# Patient Record
Sex: MALE | Race: WHITE | NOT HISPANIC OR LATINO | Employment: FULL TIME | URBAN - METROPOLITAN AREA
[De-identification: names, ages, dates, MRNs, and addresses within clinical notes are randomized per-mention and may not be internally consistent; named-entity substitution may affect disease eponyms.]

---

## 2018-05-29 ENCOUNTER — CONVERSION ENCOUNTER (OUTPATIENT)
Dept: SURGERY | Facility: CLINIC | Age: 48
End: 2018-05-29
Attending: SURGERY

## 2018-06-14 ENCOUNTER — OFFICE VISIT CONVERTED (OUTPATIENT)
Dept: SURGERY | Facility: CLINIC | Age: 48
End: 2018-06-14
Attending: SURGERY

## 2018-06-21 ENCOUNTER — OFFICE VISIT CONVERTED (OUTPATIENT)
Dept: SURGERY | Facility: CLINIC | Age: 48
End: 2018-06-21
Attending: SURGERY

## 2021-05-16 VITALS — RESPIRATION RATE: 14 BRPM | WEIGHT: 185.25 LBS | BODY MASS INDEX: 27.44 KG/M2 | HEIGHT: 69 IN

## 2024-06-10 ENCOUNTER — TRANSCRIBE ORDERS (OUTPATIENT)
Dept: ADMINISTRATIVE | Facility: HOSPITAL | Age: 54
End: 2024-06-10
Payer: COMMERCIAL

## 2024-06-10 DIAGNOSIS — K21.9 GERD WITHOUT ESOPHAGITIS: Primary | ICD-10-CM

## 2025-03-17 ENCOUNTER — OFFICE VISIT (OUTPATIENT)
Dept: FAMILY MEDICINE CLINIC | Facility: CLINIC | Age: 55
End: 2025-03-17
Payer: COMMERCIAL

## 2025-03-17 VITALS
DIASTOLIC BLOOD PRESSURE: 76 MMHG | HEIGHT: 69 IN | WEIGHT: 202 LBS | BODY MASS INDEX: 29.92 KG/M2 | OXYGEN SATURATION: 98 % | HEART RATE: 89 BPM | TEMPERATURE: 98 F | SYSTOLIC BLOOD PRESSURE: 124 MMHG

## 2025-03-17 DIAGNOSIS — Z12.11 ENCOUNTER FOR SCREENING FOR MALIGNANT NEOPLASM OF COLON: ICD-10-CM

## 2025-03-17 DIAGNOSIS — R06.83 SNORING: ICD-10-CM

## 2025-03-17 DIAGNOSIS — R53.83 OTHER FATIGUE: ICD-10-CM

## 2025-03-17 DIAGNOSIS — Z12.5 ENCOUNTER FOR SCREENING PROSTATE SPECIFIC ANTIGEN (PSA) MEASUREMENT: ICD-10-CM

## 2025-03-17 DIAGNOSIS — Z13.29 THYROID DISORDER SCREEN: ICD-10-CM

## 2025-03-17 DIAGNOSIS — Z11.59 ENCOUNTER FOR HEPATITIS C SCREENING TEST FOR LOW RISK PATIENT: ICD-10-CM

## 2025-03-17 DIAGNOSIS — R68.89 FORGETFULNESS: ICD-10-CM

## 2025-03-17 DIAGNOSIS — Z13.6 ENCOUNTER FOR SCREENING FOR CARDIOVASCULAR DISORDERS: ICD-10-CM

## 2025-03-17 DIAGNOSIS — Z13.1 DIABETES MELLITUS SCREENING: ICD-10-CM

## 2025-03-17 DIAGNOSIS — E66.3 OVERWEIGHT (BMI 25.0-29.9): ICD-10-CM

## 2025-03-17 DIAGNOSIS — Z00.00 ENCOUNTER FOR ANNUAL PHYSICAL EXAM: ICD-10-CM

## 2025-03-17 DIAGNOSIS — Z76.89 ENCOUNTER TO ESTABLISH CARE: Primary | ICD-10-CM

## 2025-03-17 LAB
25(OH)D3 SERPL-MCNC: 13.1 NG/ML (ref 30–100)
ALBUMIN SERPL-MCNC: 4.1 G/DL (ref 3.5–5.2)
ALBUMIN/GLOB SERPL: 1.4 G/DL
ALP SERPL-CCNC: 91 U/L (ref 39–117)
ALT SERPL W P-5'-P-CCNC: 17 U/L (ref 1–41)
ANION GAP SERPL CALCULATED.3IONS-SCNC: 10.5 MMOL/L (ref 5–15)
AST SERPL-CCNC: 21 U/L (ref 1–40)
BASOPHILS # BLD AUTO: 0.07 10*3/MM3 (ref 0–0.2)
BASOPHILS NFR BLD AUTO: 0.7 % (ref 0–1.5)
BILIRUB SERPL-MCNC: 0.3 MG/DL (ref 0–1.2)
BUN SERPL-MCNC: 10 MG/DL (ref 6–20)
BUN/CREAT SERPL: 9.1 (ref 7–25)
CALCIUM SPEC-SCNC: 9.1 MG/DL (ref 8.6–10.5)
CHLORIDE SERPL-SCNC: 104 MMOL/L (ref 98–107)
CHOLEST SERPL-MCNC: 199 MG/DL (ref 0–200)
CO2 SERPL-SCNC: 26.5 MMOL/L (ref 22–29)
CREAT SERPL-MCNC: 1.1 MG/DL (ref 0.76–1.27)
DEPRECATED RDW RBC AUTO: 44.5 FL (ref 37–54)
EGFRCR SERPLBLD CKD-EPI 2021: 80.3 ML/MIN/1.73
EOSINOPHIL # BLD AUTO: 0.33 10*3/MM3 (ref 0–0.4)
EOSINOPHIL NFR BLD AUTO: 3.5 % (ref 0.3–6.2)
ERYTHROCYTE [DISTWIDTH] IN BLOOD BY AUTOMATED COUNT: 13.1 % (ref 12.3–15.4)
FERRITIN SERPL-MCNC: 313 NG/ML (ref 30–400)
FOLATE SERPL-MCNC: 7.32 NG/ML (ref 4.78–24.2)
GLOBULIN UR ELPH-MCNC: 3 GM/DL
GLUCOSE SERPL-MCNC: 99 MG/DL (ref 65–99)
HBA1C MFR BLD: 5.6 % (ref 4.8–5.6)
HCT VFR BLD AUTO: 52.8 % (ref 37.5–51)
HCV AB SER QL: NORMAL
HDLC SERPL-MCNC: 30 MG/DL (ref 40–60)
HGB BLD-MCNC: 17.3 G/DL (ref 13–17.7)
IMM GRANULOCYTES # BLD AUTO: 0.03 10*3/MM3 (ref 0–0.05)
IMM GRANULOCYTES NFR BLD AUTO: 0.3 % (ref 0–0.5)
LDLC SERPL CALC-MCNC: 130 MG/DL (ref 0–100)
LDLC/HDLC SERPL: 4.19 {RATIO}
LYMPHOCYTES # BLD AUTO: 2.15 10*3/MM3 (ref 0.7–3.1)
LYMPHOCYTES NFR BLD AUTO: 22.7 % (ref 19.6–45.3)
MAGNESIUM SERPL-MCNC: 2.3 MG/DL (ref 1.6–2.6)
MCH RBC QN AUTO: 30.7 PG (ref 26.6–33)
MCHC RBC AUTO-ENTMCNC: 32.8 G/DL (ref 31.5–35.7)
MCV RBC AUTO: 93.6 FL (ref 79–97)
MONOCYTES # BLD AUTO: 0.75 10*3/MM3 (ref 0.1–0.9)
MONOCYTES NFR BLD AUTO: 7.9 % (ref 5–12)
NEUTROPHILS NFR BLD AUTO: 6.15 10*3/MM3 (ref 1.7–7)
NEUTROPHILS NFR BLD AUTO: 64.9 % (ref 42.7–76)
NRBC BLD AUTO-RTO: 0 /100 WBC (ref 0–0.2)
PLATELET # BLD AUTO: 270 10*3/MM3 (ref 140–450)
PMV BLD AUTO: 10.4 FL (ref 6–12)
POTASSIUM SERPL-SCNC: 4.5 MMOL/L (ref 3.5–5.2)
PROT SERPL-MCNC: 7.1 G/DL (ref 6–8.5)
PSA SERPL-MCNC: 0.9 NG/ML (ref 0–4)
RBC # BLD AUTO: 5.64 10*6/MM3 (ref 4.14–5.8)
SODIUM SERPL-SCNC: 141 MMOL/L (ref 136–145)
T4 FREE SERPL-MCNC: 0.81 NG/DL (ref 0.92–1.68)
TRIGL SERPL-MCNC: 216 MG/DL (ref 0–150)
TSH SERPL DL<=0.05 MIU/L-ACNC: 6.86 UIU/ML (ref 0.27–4.2)
VIT B12 BLD-MCNC: 629 PG/ML (ref 211–946)
VLDLC SERPL-MCNC: 39 MG/DL (ref 5–40)
WBC NRBC COR # BLD AUTO: 9.48 10*3/MM3 (ref 3.4–10.8)

## 2025-03-17 PROCEDURE — 80061 LIPID PANEL: CPT

## 2025-03-17 PROCEDURE — 80050 GENERAL HEALTH PANEL: CPT

## 2025-03-17 PROCEDURE — 86803 HEPATITIS C AB TEST: CPT

## 2025-03-17 PROCEDURE — 82746 ASSAY OF FOLIC ACID SERUM: CPT

## 2025-03-17 PROCEDURE — 82306 VITAMIN D 25 HYDROXY: CPT

## 2025-03-17 PROCEDURE — 83735 ASSAY OF MAGNESIUM: CPT

## 2025-03-17 PROCEDURE — 99386 PREV VISIT NEW AGE 40-64: CPT

## 2025-03-17 PROCEDURE — 36415 COLL VENOUS BLD VENIPUNCTURE: CPT

## 2025-03-17 PROCEDURE — 82607 VITAMIN B-12: CPT

## 2025-03-17 PROCEDURE — G0103 PSA SCREENING: HCPCS

## 2025-03-17 PROCEDURE — 84439 ASSAY OF FREE THYROXINE: CPT

## 2025-03-17 PROCEDURE — 82728 ASSAY OF FERRITIN: CPT

## 2025-03-17 PROCEDURE — 83036 HEMOGLOBIN GLYCOSYLATED A1C: CPT

## 2025-03-17 NOTE — PROGRESS NOTES
Chief Complaint  Annual Exam (With labs), Fatigue (Pt c/o being tired with no energy ), and Establish Care    Little interest or pleasure in doing things? Nearly every day   Feeling down, depressed, or hopeless? Not at all   PHQ-2 Total Score 3   Trouble falling or staying asleep, or sleeping too much? Nearly every day   Feeling tired or having little energy? Nearly every day   Poor appetite or overeating? Not at all   Feeling bad about yourself - or that you are a failure or have let yourself or your family down? Not at all   Trouble concentrating on things, such as reading the newspaper or watching television? Several days   Moving or speaking so slowly that other people could have noticed? Or the opposite - being so fidgety or restless that you have been moving around a lot more than usual? Not at all     Thoughts that you would be better off dead, or of hurting yourself in some way? Not at all   PHQ-9 Total Score 10   If you checked off any problems, how difficult have these problems made it for you to do your work, take care of things at home, or get along with other people? Somewhat difficult               History of Present Illness:  Vargas Kang is a 54 y.o. male who presents to [facility] with a past medical history of  Past Medical History:   Diagnosis Date    Carcinoma of oropharynx 2015    Stage LUIZ ([id number]) squamous cell carcinoma        History of Present Illness  The patient is a 54-year-old male who presents today to establish care for an annual wellness visit.    Approximately 6 months ago, he began experiencing gallbladder attacks, which he attributes to the consumption of greasy food. He has since eliminated vegetable oil from his diet and replaced margarine with beef tallow and real butter, resulting in the cessation of his gallbladder issues. He has observed a decrease in his weight since modifying his diet, although he still considers it to be slightly  "elevated. He reports no presence of blood in his stool. He has not had any colon cancer screening. He underwent a colonoscopy within the past 3 to 5 years and is due for a follow-up. He does not recall being informed of any vitamin deficiencies during his cancer treatment. He reports no urinary issues or pressure. He has no history of cardiac issues, chest pain, or syncope. He also reports no ear-related issues or dizziness. He occasionally experiences hip discomfort, which he attributes to prolonged periods of lying in one position. He reports no lower back pain. He has not had his thyroid checked. He reports no family history of thyroid disease, colon cancer, lung cancer, breast cancer, or heart disease. His blood pressure is usually low, around 105/60. He reports no anxiety.    Over the past few years, he has experienced a significant decrease in motivation, leading to excessive sleep and memory lapses. He believes he snores and feels aggravated due to his lack of motivation. He acknowledges the need to reduce his sugar intake and improve his physical activity levels. He is currently employed at [employer] as a .    He has a history of HPV-related cancer, which is currently in remission. He does not have any follow-up with hematology or oncology.    Supplemental Information  He had a hernia surgery in 2018 and has had no issues with that since.    SOCIAL HISTORY   He uses  chew tobacco. He hardly ever drinks alcohol. He is currently employed at [employer] as a .    FAMILY HISTORY  He reports no family history of thyroid disease, colon cancer, lung cancer, breast cancer, or heart disease. His cousins, Griselda and Chad, have diabetes.      Objective   Vital Signs:   Vitals:    03/17/25 0812   BP: 124/76   BP Location: Right arm   Pulse: 89   Temp: 98 °F (36.7 °C)   TempSrc: Temporal   SpO2: 98%   Weight: 91.6 kg (202 lb)   Height: 175.3 cm (69\")     Body mass index is 29.83 " kg/m².    Wt Readings from Last 3 Encounters:   03/17/25 91.6 kg (202 lb)   05/29/18 84 kg (185 lb 4 oz)     BP Readings from Last 3 Encounters:   03/17/25 124/76       Health Maintenance   Topic Date Due    COLORECTAL CANCER SCREENING  Never done    HEPATITIS C SCREENING  Never done    ANNUAL PHYSICAL  Never done    ZOSTER VACCINE (1 of 2) 03/17/2025 (Originally 11/9/2021)    COVID-19 Vaccine (1 - 2024-25 season) 03/19/2025 (Originally 9/1/2024)    INFLUENZA VACCINE  03/31/2025 (Originally 7/1/2024)    Pneumococcal Vaccine 50+ (1 of 1 - PCV) 03/17/2026 (Originally 11/9/2021)    TDAP/TD VACCINES (1 - Tdap) 03/17/2026 (Originally 11/9/1990)    BMI FOLLOWUP  03/17/2026       Review of Systems   Physical Exam  Vitals reviewed.   Constitutional:       Appearance: Normal appearance.   Eyes:      Pupils: Pupils are equal, round, and reactive to light.   Cardiovascular:      Rate and Rhythm: Normal rate and regular rhythm.   Pulmonary:      Effort: Pulmonary effort is normal.      Breath sounds: Normal breath sounds.   Musculoskeletal:      Cervical back: Normal range of motion.   Skin:     General: Skin is warm and dry.   Neurological:      General: No focal deficit present.      Mental Status: He is alert and oriented to person, place, and time.   Psychiatric:         Mood and Affect: Mood normal.            Result Review :  The following data was reviewed by: RICK Rao on 03/17/2025:  No visits with results within 1 Month(s) from this visit.   Latest known visit with results is:   No results found for any previous visit.       Results        Procedures        Assessment and Plan   Diagnoses and all orders for this visit:    1. Encounter to establish care (Primary)    2. Encounter for annual physical exam    3. Other fatigue  -     Vitamin B12 & Folate  -     Comprehensive Metabolic Panel  -     CBC Auto Differential  -     Magnesium    4. Snoring  -     Ferritin    5. Forgetfulness  -     Vitamin B12 &  Folate    6. Diabetes mellitus screening  -     Hemoglobin A1c    7. Encounter for screening for cardiovascular disorders  -     Lipid Panel    8. Overweight (BMI 25.0-29.9)  -     Vitamin D,25-Hydroxy    9. Encounter for hepatitis C screening test for low risk patient  -     Hepatitis C Antibody    10. Thyroid disorder screen  -     TSH Rfx On Abnormal To Free T4    11. Encounter for screening for malignant neoplasm of colon  -     Ambulatory Referral For Screening Colonoscopy    12. Encounter for screening prostate specific antigen (PSA) measurement  -     PSA Screen        Assessment & Plan  1. Annual wellness visit.  He has not had a colonoscopy in the last 3 to 5 years. He reports no issues with urination, chest pain, or passing out. He has a history of low blood pressure but no symptoms of dizziness. He is not on any medications. He is advised to set up [employer] for easy communication and access to his health information. A comprehensive panel of laboratory tests will be conducted to assess his overall health status, including thyroid function and vitamin B12 levels. A referral for a colonoscopy will be provided. A PSA screening will be ordered to establish a baseline for annual monitoring.    2. Fatigue and memory issues.  He reports significant fatigue, lack of motivation, and memory issues over the past couple of years. These symptoms may be related to dietary changes or potential vitamin B12 deficiency. Blood work will be done to check for vitamin B12 levels and other potential deficiencies. If the results indicate a deficiency, B12 injections may be considered.    3. History of HPV-related oropharyngeal carcinoma.  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 He does not currently follow up with hematology or oncology.    PROCEDURE  Colonoscopy was performed approximately 3 to 5 years ago.  Hernia surgery was performed in 2018.      BMI cannot be calculated due  to outdated height or weight values.  Please input a current height/weight in Vitals and re-renter BMIFOLLOWUP in Note to pull in correct documentation based on BMI range.         FOLLOW UP  Return in about 4 weeks (around 4/14/2025) for Recheck.    Patient was given instructions and counseling regarding his condition or for health maintenance advice. Please see specific information pulled into the AVS if appropriate.       RICK Rao  03/17/25  08:52 EDT    CURRENT & DISCONTINUED MEDICATIONS  No current outpatient medications    There are no discontinued medications.     EMR Dragon/Transcription disclaimer:  Parts of this encounter note are electronic transcription/translation of spoken language to printed text.     Patient or patient representative verbalized consent for the use of Ambient Listening during the visit with  RICK Rao for chart documentation. 3/17/2025  08:15 EDT

## 2025-03-17 NOTE — PROGRESS NOTES
Venipuncture Blood Specimen Collection  Venipuncture performed in Lt arm by Hannah Cheng with good hemostasis. Patient tolerated the procedure well without complications.   03/17/25   Dari Dempsey CMA/[facility]EN

## 2025-03-19 DIAGNOSIS — R53.83 OTHER FATIGUE: Primary | ICD-10-CM

## 2025-03-27 ENCOUNTER — CLINICAL SUPPORT (OUTPATIENT)
Dept: GASTROENTEROLOGY | Facility: CLINIC | Age: 55
End: 2025-03-27
Payer: COMMERCIAL

## 2025-03-27 ENCOUNTER — PREP FOR SURGERY (OUTPATIENT)
Dept: OTHER | Facility: HOSPITAL | Age: 55
End: 2025-03-27
Payer: COMMERCIAL

## 2025-03-27 DIAGNOSIS — Z12.11 ENCOUNTER FOR SCREENING FOR MALIGNANT NEOPLASM OF COLON: Primary | ICD-10-CM

## 2025-03-27 RX ORDER — SODIUM PICOSULFATE, MAGNESIUM OXIDE, AND ANHYDROUS CITRIC ACID 12; 3.5; 1 G/175ML; G/175ML; MG/175ML
175 LIQUID ORAL TAKE AS DIRECTED
Qty: 175 ML | Refills: 0 | Status: SHIPPED | OUTPATIENT
Start: 2025-03-27

## 2025-03-27 NOTE — PROGRESS NOTES
Vargas Kang  1970  54 y.o.    Reason for call: Screening Colonoscopy  If recall, please list diagnosis: N/A Please note this diagnosis should be entered in the case request  Prep prescribed: Clenpiq  Prep instructions reviewed with patient and sent to patient via regular mail to the home address on file  Is the patient currently on any injectable or oral medications for weight loss or diabetes? No  Clearance needed? No  If yes, what clearance is needed? N/A  Clearance has been requested from N/A  The patient has been scheduled for: Colonoscopy    Vargas Kang is aware they have been scheduled for a screening colonoscopy. Patient has expressed they are not having any symptoms at all.       After your procedure, you will be contacted with results. Please confirm the best phone # to reach the patient: [phone]  Family history of colon cancer? Yes  If yes, indicate relative: AUNT  Tentative Procedure Date: 6.24.2025    Date/Place of last Scope: N/A  Able to obtain report? no        Family History   Problem Relation Age of Onset    Asthma Mother     Irritable bowel syndrome Father     Colon cancer Maternal Aunt      Past Medical History:   Diagnosis Date    Carcinoma of oropharynx 2015    Stage LUIZ ([id number]) squamous cell carcinoma    Cholelithiasis 2024    Just started but stop when I changed diet    Hernia 2018    Irritable bowel syndrome 2009    Depends on ehat i eat     Allergies   Allergen Reactions    Vibramycin [Doxycycline Calcium]      Past Surgical History:   Procedure Laterality Date    COLONOSCOPY      Had done years ago.    TONSILLECTOMY Right 04/06/2015     Social History     Socioeconomic History    Marital status: Single    Years of education: High School   Tobacco Use    Smoking status: Former     Current packs/day: 0.00     Average packs/day: 0.5 packs/day for 52.0 years (28.0 ttl pk-yrs)     Types: Cigarettes     Start date: 7/17/1991     Quit date: 8/1/2023     Years since quitting:  1.6    Smokeless tobacco: Current     Types: Chew   Vaping Use    Vaping status: Never Used   Substance and Sexual Activity    Alcohol use: Not Currently     Comment: Hardly ever drink    Drug use: No    Sexual activity: Yes     Partners: Female     Birth control/protection: None       Current Outpatient Medications:     vitamin D (ERGOCALCIFEROL) 1.25 MG (18150 UT) capsule capsule, Take 1 capsule by mouth 1 (One) Time Per Week., Disp: 12 capsule, Rfl: 1

## 2025-06-10 ENCOUNTER — CLINICAL SUPPORT (OUTPATIENT)
Dept: FAMILY MEDICINE CLINIC | Facility: CLINIC | Age: 55
End: 2025-06-10
Payer: COMMERCIAL

## 2025-06-10 DIAGNOSIS — R53.83 OTHER FATIGUE: Primary | ICD-10-CM

## 2025-06-10 PROCEDURE — 84443 ASSAY THYROID STIM HORMONE: CPT

## 2025-06-10 PROCEDURE — 84402 ASSAY OF FREE TESTOSTERONE: CPT

## 2025-06-10 PROCEDURE — 36415 COLL VENOUS BLD VENIPUNCTURE: CPT

## 2025-06-10 PROCEDURE — 84439 ASSAY OF FREE THYROXINE: CPT

## 2025-06-10 PROCEDURE — 84403 ASSAY OF TOTAL TESTOSTERONE: CPT

## 2025-06-10 NOTE — PROGRESS NOTES
..  Venipuncture Blood Specimen Collection  Venipuncture performed in LT arm by More Fang MA with good hemostasis. Patient tolerated the procedure well without complications.   06/10/25   More Fang MA

## 2025-06-13 NOTE — PAT
Left message on voicemail with arrival time of  0600.    Come to [facility], entrance C. Bring picture ID and insurance card, have  over 18 to give ride home.     Bring medication list but do not take any meds except inhalers that morning. Bring medications with you to the hospital, including inhalers.     Complete prep prior to arrival. Clear liquids all day the day before, and start prep as instructed.     Complete prep and any water may need to drink at least 2 hours prior to arrival. No gum, mints, water, or anything else in mouth after that.      Plan to be here at least 3-4 hours. Do not bring any valuables with you to the hospital.     Call [phone] with any questions.

## 2025-06-17 NOTE — PAT
Pt verifies received previous message, verbalizes understanding of instructions, verifies arrival time of   0600.  Will be here for procedure and has  lined up.

## 2025-06-23 ENCOUNTER — TELEPHONE (OUTPATIENT)
Dept: GASTROENTEROLOGY | Facility: CLINIC | Age: 55
End: 2025-06-23
Payer: COMMERCIAL

## 2025-06-23 ENCOUNTER — ANESTHESIA EVENT (OUTPATIENT)
Dept: GASTROENTEROLOGY | Facility: HOSPITAL | Age: 55
End: 2025-06-23
Payer: COMMERCIAL

## 2025-06-23 NOTE — ANESTHESIA PREPROCEDURE EVALUATION
Anesthesia Evaluation     Patient summary reviewed and Nursing notes reviewed   NPO Solid Status: > 8 hours  NPO Liquid Status: > 8 hours           Airway   Mallampati: II  TM distance: >3 FB  Neck ROM: full  Possible difficult intubation  Comment: Previous oropharynx CA with radiation 10 yrs ago -   Dental    (+) edentulous    Pulmonary     breath sounds clear to auscultation  (+) a smoker (current vaping) Current, vape,  Cardiovascular - normal exam  Exercise tolerance: good (4-7 METS)    Rhythm: regular  Rate: normal        Neuro/Psych  GI/Hepatic/Renal/Endo      Musculoskeletal     Abdominal    Substance History      OB/GYN          Other      history of cancer (carcinoma of oropharynx)    ROS/Med Hx Other: Hx of Carcinoma of oropharynx Stage LUIZ ([id number]) squamous cell carcinoma - previous radiation 10 yrs ago , no issues since then     No EKG on file                 Anesthesia Plan    ASA 2     general   total IV anesthesia  (Total IV Anesthesia    Patient understands anesthesia not responsible for dental damage.    Discussed risks with pt including aspiration, allergic reactions, apnea, advanced airway placement. Pt verbalized understanding. All questions answered.    )  intravenous induction     Anesthetic plan, risks, benefits, and alternatives have been provided, discussed and informed consent has been obtained with: patient and spouse/significant other.  Pre-procedure education provided  Plan discussed with CRNA.      CODE STATUS:

## 2025-06-23 NOTE — TELEPHONE ENCOUNTER
ENDO RECONCILIATION    Verify source of procedure(s): Nurse/MA screening    Referral Rec'd from RICK Rao    TIME OUT-CONFIRM CORRECT PROCEDURE: Colonoscopy    ENDO RECONCILIATION COMPLETE

## 2025-06-24 ENCOUNTER — HOSPITAL ENCOUNTER (OUTPATIENT)
Facility: HOSPITAL | Age: 55
Setting detail: HOSPITAL OUTPATIENT SURGERY
Discharge: HOME | End: 2025-06-24
Attending: INTERNAL MEDICINE | Admitting: INTERNAL MEDICINE
Payer: COMMERCIAL

## 2025-06-24 ENCOUNTER — ANESTHESIA (OUTPATIENT)
Dept: GASTROENTEROLOGY | Facility: HOSPITAL | Age: 55
End: 2025-06-24
Payer: COMMERCIAL

## 2025-06-24 VITALS
HEART RATE: 68 BPM | SYSTOLIC BLOOD PRESSURE: 95 MMHG | TEMPERATURE: 97 F | WEIGHT: 199.52 LBS | DIASTOLIC BLOOD PRESSURE: 71 MMHG | RESPIRATION RATE: 18 BRPM | OXYGEN SATURATION: 95 % | BODY MASS INDEX: 29.46 KG/M2

## 2025-06-24 DIAGNOSIS — Z12.11 ENCOUNTER FOR SCREENING FOR MALIGNANT NEOPLASM OF COLON: ICD-10-CM

## 2025-06-24 PROCEDURE — 88305 TISSUE EXAM BY PATHOLOGIST: CPT | Performed by: INTERNAL MEDICINE

## 2025-06-24 PROCEDURE — 45385 COLONOSCOPY W/LESION REMOVAL: CPT | Performed by: INTERNAL MEDICINE

## 2025-06-24 PROCEDURE — 25010000002 LIDOCAINE PF 2% 2 % SOLUTION

## 2025-06-24 PROCEDURE — 25010000002 PHENYLEPHRINE HCL-NACL 1000-0.9 MCG/10ML-% SOLUTION PREFILLED SYRINGE

## 2025-06-24 PROCEDURE — 25810000003 LACTATED RINGERS PER 1000 ML

## 2025-06-24 PROCEDURE — 25010000002 PROPOFOL 10 MG/ML EMULSION

## 2025-06-24 RX ORDER — PHENYLEPHRINE HCL IN 0.9% NACL 1 MG/10 ML
SYRINGE (ML) INTRAVENOUS AS NEEDED
Status: DISCONTINUED | OUTPATIENT
Start: 2025-06-24 | End: 2025-06-24 | Stop reason: SURG

## 2025-06-24 RX ORDER — PROPOFOL 10 MG/ML
VIAL (ML) INTRAVENOUS AS NEEDED
Status: DISCONTINUED | OUTPATIENT
Start: 2025-06-24 | End: 2025-06-24 | Stop reason: SURG

## 2025-06-24 RX ORDER — LIDOCAINE HYDROCHLORIDE 20 MG/ML
INJECTION, SOLUTION EPIDURAL; INFILTRATION; INTRACAUDAL; PERINEURAL AS NEEDED
Status: DISCONTINUED | OUTPATIENT
Start: 2025-06-24 | End: 2025-06-24 | Stop reason: SURG

## 2025-06-24 RX ORDER — SODIUM CHLORIDE, SODIUM LACTATE, POTASSIUM CHLORIDE, CALCIUM CHLORIDE 600; 310; 30; 20 MG/100ML; MG/100ML; MG/100ML; MG/100ML
30 INJECTION, SOLUTION INTRAVENOUS CONTINUOUS
Status: DISCONTINUED | OUTPATIENT
Start: 2025-06-24 | End: 2025-06-24 | Stop reason: HOSPADM

## 2025-06-24 RX ADMIN — Medication 50 MCG: at 07:40

## 2025-06-24 RX ADMIN — Medication 50 MCG: at 07:37

## 2025-06-24 RX ADMIN — PROPOFOL 90 MG: 10 INJECTION, EMULSION INTRAVENOUS at 07:27

## 2025-06-24 RX ADMIN — SODIUM CHLORIDE, POTASSIUM CHLORIDE, SODIUM LACTATE AND CALCIUM CHLORIDE 30 ML/HR: 600; 310; 30; 20 INJECTION, SOLUTION INTRAVENOUS at 06:40

## 2025-06-24 RX ADMIN — PROPOFOL 250 MCG/KG/MIN: 10 INJECTION, EMULSION INTRAVENOUS at 07:27

## 2025-06-24 RX ADMIN — LIDOCAINE HYDROCHLORIDE 40 MG: 20 INJECTION, SOLUTION INTRAVENOUS at 07:27

## 2025-06-24 NOTE — H&P
Pre Procedure History & Physical    Chief Complaint:   Screening    Subjective     HPI:   Cancer screening    Past Medical History:   Past Medical History:   Diagnosis Date    Carcinoma of oropharynx 2015    Stage LUIZ ([id number]) squamous cell carcinoma    Cholelithiasis 2024    Just started but stop when I changed diet    Hernia 2018    Irritable bowel syndrome 2009    Depends on ehat i eat       Past Surgical History:  Past Surgical History:   Procedure Laterality Date    COLONOSCOPY      Had done years ago.    TONSILLECTOMY Right 04/06/2015       Family History:  Family History   Problem Relation Age of Onset    Asthma Mother     Irritable bowel syndrome Father     Colon cancer Maternal Aunt        Social History:   reports that he quit smoking about 22 months ago. His smoking use included cigarettes. He started smoking about 33 years ago. He has a 28 pack-year smoking history. His smokeless tobacco use includes chew. He reports that he does not currently use alcohol. He reports that he does not use drugs.    Medications:   Medications Prior to Admission   Medication Sig Dispense Refill Last Dose/Taking    vitamin D (ERGOCALCIFEROL) 1.25 MG (59363 UT) capsule capsule Take 1 capsule by mouth 1 (One) Time Per Week. 12 capsule 1        Allergies:  Vibramycin [doxycycline calcium]        Objective     Blood pressure 99/74, pulse 65, temperature 97.7 °F (36.5 °C), temperature source Temporal, resp. rate 11, weight 90.5 kg (199 lb 8.3 oz), SpO2 96%.    Physical Exam   Constitutional: Pt is oriented to person, place, and time and well-developed, well-nourished, and in no distress.   Mouth/Throat: Oropharynx is clear and moist.   Neck: Normal range of motion.   Cardiovascular: Normal rate, regular rhythm and normal heart sounds.    Pulmonary/Chest: Effort normal and breath sounds normal.   Abdominal: Soft. Nontender  Skin: Skin is warm and dry.   Psychiatric: Mood, memory, affect and judgment normal.     Assessment & Plan      Diagnosis:  Screening    Anticipated Surgical Procedure:  Colonoscopy    The risks, benefits, and alternatives of this procedure have been discussed with the patient or the responsible party- the patient understands and agrees to proceed.

## 2025-06-25 LAB
CYTO UR: NORMAL
LAB AP CASE REPORT: NORMAL
LAB AP CLINICAL INFORMATION: NORMAL
PATH REPORT.FINAL DX SPEC: NORMAL
PATH REPORT.GROSS SPEC: NORMAL

## (undated) DEVICE — DEFENDO AIR WATER SUCTION AND BIOPSY VALVE KIT FOR  OLYMPUS: Brand: DEFENDO AIR/WATER/SUCTION AND BIOPSY VALVE

## (undated) DEVICE — THE SINGLE USE ETRAP – POLYP TRAP IS USED FOR SUCTION RETRIEVAL OF ENDOSCOPICALLY REMOVED POLYPS.: Brand: ETRAP

## (undated) DEVICE — SOLIDIFIER LIQLOC PLS 1500CC BT

## (undated) DEVICE — SOL IRRG H2O PL/BG 1000ML STRL

## (undated) DEVICE — Device

## (undated) DEVICE — SNAR E/S POLYP SNAREMASTER OVL/10MM 2.8X2300MM YEL